# Patient Record
Sex: MALE | Race: OTHER | URBAN - METROPOLITAN AREA
[De-identification: names, ages, dates, MRNs, and addresses within clinical notes are randomized per-mention and may not be internally consistent; named-entity substitution may affect disease eponyms.]

---

## 2022-09-14 ENCOUNTER — CONSULTATION/EVALUATION (OUTPATIENT)
Dept: URBAN - METROPOLITAN AREA CLINIC 14 | Facility: CLINIC | Age: 57
End: 2022-09-14

## 2022-09-14 DIAGNOSIS — H52.7: ICD-10-CM

## 2022-09-14 PROCEDURE — 99499LK REFRACTIVE CONSULT/LASIK

## 2022-09-14 ASSESSMENT — KERATOMETRY
OD_AXISANGLE2_DEGREES: 60
OS_AXISANGLE_DEGREES: 27
OD_K1POWER_DIOPTERS: 40.25
OD_K2POWER_DIOPTERS: 41.25
OS_K1POWER_DIOPTERS: 40.25
OS_AXISANGLE2_DEGREES: 117
OD_AXISANGLE_DEGREES: 150
OS_K2POWER_DIOPTERS: 40.75

## 2022-09-14 ASSESSMENT — VISUAL ACUITY
OD_SC: 20/40
OD_CC: 20/25 SLOW
OD_BCVA: 20/25
OS_SC: 20/80
OS_BCVA: 20/20
OS_CC: 20/25 SLOW

## 2022-09-14 ASSESSMENT — PACHYMETRY
OD_CT_UM: 586
OS_CT_UM: 578

## 2022-09-20 NOTE — PATIENT DISCUSSION
Trial wear tweak to rx and ok to order.  Patient's rx has trended down since 2015 (-2.50 OU at that time).  Looking at -2.00 OU today.

## 2022-09-20 NOTE — PATIENT DISCUSSION
Trial wear tweak to rx and ok to order.  Patient's rx has trended down since 2015 (-2.50 OU at that time).  Looking at -2.00 OU today.   Discussed the near buffer we might gain by cutting 0.25 minus.  Told him we can switch it back to -2.25 OU if he feels it is the better rx.

## 2022-09-28 ENCOUNTER — ESTABLISHED PATIENT (OUTPATIENT)
Dept: URBAN - METROPOLITAN AREA CLINIC 14 | Facility: CLINIC | Age: 57
End: 2022-09-28

## 2022-09-28 DIAGNOSIS — H52.7: ICD-10-CM

## 2022-09-28 DIAGNOSIS — H52.13: ICD-10-CM

## 2022-09-28 PROCEDURE — 99199RLEC RLE CANDIDATE

## 2022-09-28 ASSESSMENT — TONOMETRY
OD_IOP_MMHG: 15
OS_IOP_MMHG: 16

## 2022-09-28 ASSESSMENT — KERATOMETRY
OS_K2POWER_DIOPTERS: 40.75
OS_AXISANGLE_DEGREES: 27
OD_AXISANGLE2_DEGREES: 60
OD_K1POWER_DIOPTERS: 40.25
OS_K1POWER_DIOPTERS: 40.25
OS_AXISANGLE2_DEGREES: 117
OD_K2POWER_DIOPTERS: 41.25
OD_AXISANGLE_DEGREES: 150

## 2022-09-28 ASSESSMENT — VISUAL ACUITY
OD_BCVA: 20/25
OS_CC: 20/20
OD_CC: 20/25
OU_CC: 20/20
OS_BCVA: 20/20

## 2022-10-31 ASSESSMENT — KERATOMETRY
OD_K2POWER_DIOPTERS: 41.25
OS_AXISANGLE_DEGREES: 27
OD_K1POWER_DIOPTERS: 40.25
OS_K2POWER_DIOPTERS: 40.75
OS_AXISANGLE2_DEGREES: 117
OD_AXISANGLE_DEGREES: 150
OD_AXISANGLE2_DEGREES: 60
OS_K1POWER_DIOPTERS: 40.25

## 2022-11-03 ENCOUNTER — ESTABLISHED PATIENT (OUTPATIENT)
Dept: URBAN - METROPOLITAN AREA CLINIC 11 | Facility: CLINIC | Age: 57
End: 2022-11-03

## 2022-11-03 DIAGNOSIS — H25.13: ICD-10-CM

## 2022-11-03 DIAGNOSIS — H43.393: ICD-10-CM

## 2022-11-03 DIAGNOSIS — H52.13: ICD-10-CM

## 2022-11-03 DIAGNOSIS — H35.373: ICD-10-CM

## 2022-11-03 PROCEDURE — 99214 OFFICE O/P EST MOD 30 MIN: CPT

## 2022-11-03 PROCEDURE — 92201 OPSCPY EXTND RTA DRAW UNI/BI: CPT

## 2022-11-03 PROCEDURE — 92134 CPTRZ OPH DX IMG PST SGM RTA: CPT

## 2022-11-03 ASSESSMENT — TONOMETRY
OD_IOP_MMHG: 12
OS_IOP_MMHG: 14

## 2022-11-03 ASSESSMENT — VISUAL ACUITY
OS_SC: 20/40
OD_SC: 20/30+2
OS_PH: 20/25+2

## 2022-12-01 ENCOUNTER — POST OP/EVAL OF SECOND EYE (OUTPATIENT)
Dept: URBAN - METROPOLITAN AREA CLINIC 14 | Facility: CLINIC | Age: 57
End: 2022-12-01

## 2022-12-01 PROCEDURE — 99024 POSTOP FOLLOW-UP VISIT: CPT

## 2022-12-01 ASSESSMENT — KERATOMETRY
OD_AXISANGLE2_DEGREES: 60
OD_K1POWER_DIOPTERS: 40.25
OS_AXISANGLE2_DEGREES: 117
OS_AXISANGLE_DEGREES: 27
OS_K1POWER_DIOPTERS: 40.25
OD_AXISANGLE_DEGREES: 150
OS_K2POWER_DIOPTERS: 40.75
OD_K2POWER_DIOPTERS: 41.25

## 2022-12-01 ASSESSMENT — TONOMETRY
OD_IOP_MMHG: 35
OD_IOP_MMHG: 26
OS_IOP_MMHG: 23

## 2022-12-01 ASSESSMENT — VISUAL ACUITY
OD_SC: 20/40
OS_BCVA: 20/20

## 2022-12-08 ENCOUNTER — POST-OP (OUTPATIENT)
Dept: URBAN - METROPOLITAN AREA CLINIC 14 | Facility: CLINIC | Age: 57
End: 2022-12-08

## 2022-12-08 PROCEDURE — 99024 POSTOP FOLLOW-UP VISIT: CPT

## 2022-12-08 ASSESSMENT — TONOMETRY
OS_IOP_MMHG: 36
OD_IOP_MMHG: 20
OS_IOP_MMHG: 30
OS_IOP_MMHG: 28

## 2022-12-08 ASSESSMENT — VISUAL ACUITY
OD_SC: 20/40
OS_SC: 20/40
OU_SC: 20/30+2

## 2022-12-14 ENCOUNTER — POST-OP (OUTPATIENT)
Dept: URBAN - METROPOLITAN AREA CLINIC 14 | Facility: CLINIC | Age: 57
End: 2022-12-14

## 2022-12-14 PROCEDURE — 99024 POSTOP FOLLOW-UP VISIT: CPT

## 2022-12-14 ASSESSMENT — TONOMETRY
OS_IOP_MMHG: 22
OD_IOP_MMHG: 17

## 2022-12-14 ASSESSMENT — VISUAL ACUITY
OS_SC: 20/25
OD_SC: 20/40

## 2023-01-09 ENCOUNTER — POST-OP (OUTPATIENT)
Dept: URBAN - METROPOLITAN AREA CLINIC 14 | Facility: CLINIC | Age: 58
End: 2023-01-09

## 2023-01-09 DIAGNOSIS — Z96.1: ICD-10-CM

## 2023-01-09 PROCEDURE — 99024 POSTOP FOLLOW-UP VISIT: CPT

## 2023-01-09 ASSESSMENT — VISUAL ACUITY
OS_SC: 20/40
OD_SC: 20/50
OD_BCVA: 20/20
OS_BCVA: 20/20

## 2023-01-13 ENCOUNTER — POST-OP (OUTPATIENT)
Dept: URBAN - METROPOLITAN AREA CLINIC 14 | Facility: CLINIC | Age: 58
End: 2023-01-13

## 2023-01-13 DIAGNOSIS — Z96.1: ICD-10-CM

## 2023-01-13 PROCEDURE — 99024 POSTOP FOLLOW-UP VISIT: CPT

## 2023-01-13 ASSESSMENT — VISUAL ACUITY
OS_SC: 20/25
OD_SC: 20/20-1

## 2023-01-18 ENCOUNTER — POST-OP (OUTPATIENT)
Dept: URBAN - METROPOLITAN AREA CLINIC 14 | Facility: CLINIC | Age: 58
End: 2023-01-18

## 2023-01-18 DIAGNOSIS — Z96.1: ICD-10-CM

## 2023-01-18 PROCEDURE — 99024 POSTOP FOLLOW-UP VISIT: CPT

## 2023-01-18 ASSESSMENT — VISUAL ACUITY
OS_SC: 20/20
OD_SC: 20/20

## 2023-01-25 ENCOUNTER — POST-OP (OUTPATIENT)
Dept: URBAN - METROPOLITAN AREA CLINIC 14 | Facility: CLINIC | Age: 58
End: 2023-01-25

## 2023-01-25 DIAGNOSIS — Z96.1: ICD-10-CM

## 2023-01-25 PROCEDURE — 99024 POSTOP FOLLOW-UP VISIT: CPT

## 2023-01-25 ASSESSMENT — VISUAL ACUITY
OD_SC: 20/25
OS_SC: 20/25
OU_SC: 20/20

## 2023-04-07 ENCOUNTER — POST-OP (OUTPATIENT)
Dept: URBAN - METROPOLITAN AREA CLINIC 14 | Facility: CLINIC | Age: 58
End: 2023-04-07

## 2023-04-07 DIAGNOSIS — Z98.890: ICD-10-CM

## 2023-04-07 DIAGNOSIS — Z96.1: ICD-10-CM

## 2023-04-07 PROCEDURE — 99024 POSTOP FOLLOW-UP VISIT: CPT

## 2023-04-07 ASSESSMENT — VISUAL ACUITY
OS_SC: 20/20-2
OD_SC: 20/25

## 2023-04-07 ASSESSMENT — TONOMETRY
OS_IOP_MMHG: 15
OD_IOP_MMHG: 15

## 2023-09-29 ENCOUNTER — ESTABLISHED PATIENT (OUTPATIENT)
Dept: URBAN - METROPOLITAN AREA CLINIC 14 | Facility: CLINIC | Age: 58
End: 2023-09-29

## 2023-09-29 DIAGNOSIS — H35.373: ICD-10-CM

## 2023-09-29 DIAGNOSIS — H04.123: ICD-10-CM

## 2023-09-29 DIAGNOSIS — D31.32: ICD-10-CM

## 2023-09-29 PROCEDURE — 92134 CPTRZ OPH DX IMG PST SGM RTA: CPT

## 2023-09-29 PROCEDURE — 99213 OFFICE O/P EST LOW 20 MIN: CPT

## 2023-09-29 ASSESSMENT — VISUAL ACUITY
OS_SC: 20/25
OD_SC: 20/25-1

## 2023-09-29 ASSESSMENT — TONOMETRY
OD_IOP_MMHG: 18
OS_IOP_MMHG: 21

## 2023-10-13 ENCOUNTER — ESTABLISHED PATIENT (OUTPATIENT)
Dept: URBAN - METROPOLITAN AREA CLINIC 11 | Facility: CLINIC | Age: 58
End: 2023-10-13

## 2023-10-13 DIAGNOSIS — D31.32: ICD-10-CM

## 2023-10-13 DIAGNOSIS — H43.812: ICD-10-CM

## 2023-10-13 DIAGNOSIS — H43.821: ICD-10-CM

## 2023-10-13 DIAGNOSIS — H43.392: ICD-10-CM

## 2023-10-13 PROCEDURE — 92014 COMPRE OPH EXAM EST PT 1/>: CPT

## 2023-10-13 PROCEDURE — 92201 OPSCPY EXTND RTA DRAW UNI/BI: CPT

## 2023-10-13 PROCEDURE — 92134 CPTRZ OPH DX IMG PST SGM RTA: CPT

## 2023-10-13 ASSESSMENT — VISUAL ACUITY
OD_SC: 20/25+2
OS_SC: 20/20-2

## 2023-10-13 ASSESSMENT — TONOMETRY
OD_IOP_MMHG: 16
OS_IOP_MMHG: 14

## 2024-11-15 ENCOUNTER — COMPREHENSIVE EXAM (OUTPATIENT)
Dept: URBAN - METROPOLITAN AREA CLINIC 14 | Facility: CLINIC | Age: 59
End: 2024-11-15

## 2024-11-15 DIAGNOSIS — H04.123: ICD-10-CM

## 2024-11-15 PROCEDURE — 99213 OFFICE O/P EST LOW 20 MIN: CPT
